# Patient Record
Sex: FEMALE | Race: WHITE | NOT HISPANIC OR LATINO | Employment: UNEMPLOYED | URBAN - METROPOLITAN AREA
[De-identification: names, ages, dates, MRNs, and addresses within clinical notes are randomized per-mention and may not be internally consistent; named-entity substitution may affect disease eponyms.]

---

## 2017-12-28 ENCOUNTER — OFFICE VISIT (OUTPATIENT)
Dept: OPHTHALMOLOGY | Facility: CLINIC | Age: 62
End: 2017-12-28
Payer: COMMERCIAL

## 2017-12-28 DIAGNOSIS — H52.4 MYOPIA WITH ASTIGMATISM AND PRESBYOPIA, BILATERAL: ICD-10-CM

## 2017-12-28 DIAGNOSIS — H52.203 MYOPIA WITH ASTIGMATISM AND PRESBYOPIA, BILATERAL: ICD-10-CM

## 2017-12-28 DIAGNOSIS — H52.13 MYOPIA WITH ASTIGMATISM AND PRESBYOPIA, BILATERAL: ICD-10-CM

## 2017-12-28 DIAGNOSIS — H25.13 NUCLEAR SCLEROSIS, BILATERAL: Primary | ICD-10-CM

## 2017-12-28 DIAGNOSIS — H25.013 CORTICAL AGE-RELATED CATARACT OF BOTH EYES: ICD-10-CM

## 2017-12-28 DIAGNOSIS — H18.529 CORNEAL EPITHELIAL AND BASEMENT MEMBRANE DYSTROPHY: ICD-10-CM

## 2017-12-28 PROCEDURE — 92004 COMPRE OPH EXAM NEW PT 1/>: CPT | Mod: S$GLB,,, | Performed by: OPTOMETRIST

## 2017-12-28 PROCEDURE — 99999 PR PBB SHADOW E&M-NEW PATIENT-LVL I: CPT | Mod: PBBFAC,,, | Performed by: OPTOMETRIST

## 2017-12-28 PROCEDURE — 92015 DETERMINE REFRACTIVE STATE: CPT | Mod: S$GLB,,, | Performed by: OPTOMETRIST

## 2017-12-28 NOTE — PROGRESS NOTES
HPI     Pts last exam was 12/3/14 with DNL. PT c/o overall blurred va and wears   gls for driving at night and otc readers prn. Did not bring gls today.   HPI    Any vision changes since last exam: no  Eye pain: no  Other ocular symptoms: dryness OS, no gtts    Do you wear currently wear glasses or contacts? gls and otc readers    Interested in contacts today? no    Do you plan on getting new glasses today? If needed      Last edited by Natalia Casey MA on 12/28/2017  3:44 PM. (History)            Assessment /Plan     For exam results, see Encounter Report.    Nuclear sclerosis, bilateral  Cortical age-related cataract of both eyes  Surgery is not indicated at this time. Monitor 12 months.    Corneal epithelial and basement membrane dystrophy  Minimally symptomatic   Recommended daily lubrication with AT bid OU    Myopia with astigmatism and presbyopia, bilateral  Eyeglass Final Rx     Eyeglass Final Rx       Sphere Cylinder Axis Dist VA Add    Right -0.75 +1.00 163 20/20 +2.50    Left -0.25 +0.50 005 20/20 +2.50    Expiration Date:  12/29/2018                RTC 1 yr for dilated eye exam or PRN if any problems.   Discussed above and answered questions.

## 2018-06-25 ENCOUNTER — OFFICE VISIT (OUTPATIENT)
Dept: OPHTHALMOLOGY | Facility: CLINIC | Age: 63
End: 2018-06-25
Payer: COMMERCIAL

## 2018-06-25 DIAGNOSIS — H20.9 TRAUMATIC IRITIS: Primary | ICD-10-CM

## 2018-06-25 PROCEDURE — 99999 PR PBB SHADOW E&M-EST. PATIENT-LVL I: CPT | Mod: PBBFAC,,, | Performed by: OPTOMETRIST

## 2018-06-25 PROCEDURE — 92014 COMPRE OPH EXAM EST PT 1/>: CPT | Mod: S$GLB,,, | Performed by: OPTOMETRIST

## 2018-06-25 RX ORDER — ROSUVASTATIN CALCIUM 5 MG/1
5 TABLET, COATED ORAL
COMMUNITY
Start: 2018-03-19 | End: 2019-03-19

## 2018-06-25 RX ORDER — PREDNISOLONE ACETATE 10 MG/ML
1 SUSPENSION/ DROPS OPHTHALMIC 4 TIMES DAILY
Qty: 5 ML | Refills: 0 | Status: SHIPPED | OUTPATIENT
Start: 2018-06-25 | End: 2018-07-02
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
## 2021-07-08 ENCOUNTER — OFFICE VISIT (OUTPATIENT)
Dept: OPHTHALMOLOGY | Facility: CLINIC | Age: 66
End: 2021-07-08
Payer: COMMERCIAL

## 2021-07-08 DIAGNOSIS — H25.13 NUCLEAR SCLEROSIS, BILATERAL: Primary | ICD-10-CM

## 2021-07-08 DIAGNOSIS — H25.013 CORTICAL AGE-RELATED CATARACT OF BOTH EYES: ICD-10-CM

## 2021-07-08 DIAGNOSIS — H52.4 PRESBYOPIA: ICD-10-CM

## 2021-07-08 DIAGNOSIS — H52.222 REGULAR ASTIGMATISM OF LEFT EYE: ICD-10-CM

## 2021-07-08 PROCEDURE — 92015 PR REFRACTION: ICD-10-PCS | Mod: S$GLB,,, | Performed by: OPTOMETRIST

## 2021-07-08 PROCEDURE — 99999 PR PBB SHADOW E&M-NEW PATIENT-LVL II: ICD-10-PCS | Mod: PBBFAC,,, | Performed by: OPTOMETRIST

## 2021-07-08 PROCEDURE — 92004 PR EYE EXAM, NEW PATIENT,COMPREHESV: ICD-10-PCS | Mod: S$GLB,,, | Performed by: OPTOMETRIST

## 2021-07-08 PROCEDURE — 92015 DETERMINE REFRACTIVE STATE: CPT | Mod: S$GLB,,, | Performed by: OPTOMETRIST

## 2021-07-08 PROCEDURE — 92004 COMPRE OPH EXAM NEW PT 1/>: CPT | Mod: S$GLB,,, | Performed by: OPTOMETRIST

## 2021-07-08 PROCEDURE — 99999 PR PBB SHADOW E&M-NEW PATIENT-LVL II: CPT | Mod: PBBFAC,,, | Performed by: OPTOMETRIST

## 2023-05-03 ENCOUNTER — PATIENT MESSAGE (OUTPATIENT)
Dept: RESEARCH | Facility: HOSPITAL | Age: 68
End: 2023-05-03
Payer: COMMERCIAL

## 2023-11-09 ENCOUNTER — OFFICE VISIT (OUTPATIENT)
Dept: OPHTHALMOLOGY | Facility: CLINIC | Age: 68
End: 2023-11-09
Payer: MEDICARE

## 2023-11-09 DIAGNOSIS — H52.4 ASTIGMATISM WITH PRESBYOPIA, BILATERAL: ICD-10-CM

## 2023-11-09 DIAGNOSIS — H25.13 NUCLEAR SCLEROSIS, BILATERAL: Primary | ICD-10-CM

## 2023-11-09 DIAGNOSIS — H52.203 ASTIGMATISM WITH PRESBYOPIA, BILATERAL: ICD-10-CM

## 2023-11-09 DIAGNOSIS — H25.013 CORTICAL AGE-RELATED CATARACT OF BOTH EYES: ICD-10-CM

## 2023-11-09 PROCEDURE — 1159F PR MEDICATION LIST DOCUMENTED IN MEDICAL RECORD: ICD-10-PCS | Mod: CPTII,S$GLB,, | Performed by: OPTOMETRIST

## 2023-11-09 PROCEDURE — 92015 DETERMINE REFRACTIVE STATE: CPT | Mod: S$GLB,,, | Performed by: OPTOMETRIST

## 2023-11-09 PROCEDURE — 92015 PR REFRACTION: ICD-10-PCS | Mod: S$GLB,,, | Performed by: OPTOMETRIST

## 2023-11-09 PROCEDURE — 1159F MED LIST DOCD IN RCRD: CPT | Mod: CPTII,S$GLB,, | Performed by: OPTOMETRIST

## 2023-11-09 PROCEDURE — 1160F RVW MEDS BY RX/DR IN RCRD: CPT | Mod: CPTII,S$GLB,, | Performed by: OPTOMETRIST

## 2023-11-09 PROCEDURE — 99999 PR PBB SHADOW E&M-EST. PATIENT-LVL II: CPT | Mod: PBBFAC,,, | Performed by: OPTOMETRIST

## 2023-11-09 PROCEDURE — 1160F PR REVIEW ALL MEDS BY PRESCRIBER/CLIN PHARMACIST DOCUMENTED: ICD-10-PCS | Mod: CPTII,S$GLB,, | Performed by: OPTOMETRIST

## 2023-11-09 PROCEDURE — 99999 PR PBB SHADOW E&M-EST. PATIENT-LVL II: ICD-10-PCS | Mod: PBBFAC,,, | Performed by: OPTOMETRIST

## 2023-11-09 PROCEDURE — 92014 COMPRE OPH EXAM EST PT 1/>: CPT | Mod: S$GLB,,, | Performed by: OPTOMETRIST

## 2023-11-09 PROCEDURE — 92014 PR EYE EXAM, EST PATIENT,COMPREHESV: ICD-10-PCS | Mod: S$GLB,,, | Performed by: OPTOMETRIST

## 2023-11-09 NOTE — PROGRESS NOTES
HPI     Annual Exam            Comments: Pt here for annual eye exam. Pt states he vision is getting   worse. Pt states things are blurry at near wears OTC readers+1.75. Pt   states headlights at night are very bright. Pt denies pain and discomfort.            Last edited by Mona Villela on 11/9/2023  2:23 PM.            Assessment /Plan     For exam results, see Encounter Report.    Nuclear sclerosis, bilateral  Cortical age-related cataract of both eyes  Cataract accounts for vision change. New Rx for glasses/contacts will not improve vision.   Refer to ophthalmologist for cataract evaluation.     Astigmatism with presbyopia, bilateral  Hold spec Rx      RTC next available with [facility] for cataract evaluation or PRN if any problems.   Discussed above and answered questions.

## 2024-01-11 ENCOUNTER — OFFICE VISIT (OUTPATIENT)
Dept: OPHTHALMOLOGY | Facility: CLINIC | Age: 69
End: 2024-01-11
Payer: MEDICARE

## 2024-01-11 DIAGNOSIS — H25.12 NUCLEAR SCLEROSIS OF LEFT EYE: ICD-10-CM

## 2024-01-11 DIAGNOSIS — H43.812 PVD (POSTERIOR VITREOUS DETACHMENT), LEFT EYE: ICD-10-CM

## 2024-01-11 DIAGNOSIS — H25.11 NUCLEAR SCLEROSIS OF RIGHT EYE: Primary | ICD-10-CM

## 2024-01-11 PROBLEM — E78.5 HYPERLIPIDEMIA: Status: ACTIVE | Noted: 2017-06-19

## 2024-01-11 PROBLEM — R73.02 IGT (IMPAIRED GLUCOSE TOLERANCE): Status: ACTIVE | Noted: 2017-12-20

## 2024-01-11 PROBLEM — M85.80 OSTEOPENIA: Status: ACTIVE | Noted: 2017-06-19

## 2024-01-11 PROCEDURE — 99999 PR PBB SHADOW E&M-EST. PATIENT-LVL III: CPT | Mod: PBBFAC,HCNC,, | Performed by: STUDENT IN AN ORGANIZED HEALTH CARE EDUCATION/TRAINING PROGRAM

## 2024-01-11 PROCEDURE — 1159F MED LIST DOCD IN RCRD: CPT | Mod: HCNC,CPTII,S$GLB, | Performed by: STUDENT IN AN ORGANIZED HEALTH CARE EDUCATION/TRAINING PROGRAM

## 2024-01-11 PROCEDURE — 1160F RVW MEDS BY RX/DR IN RCRD: CPT | Mod: HCNC,CPTII,S$GLB, | Performed by: STUDENT IN AN ORGANIZED HEALTH CARE EDUCATION/TRAINING PROGRAM

## 2024-01-11 PROCEDURE — 99204 OFFICE O/P NEW MOD 45 MIN: CPT | Mod: HCNC,S$GLB,, | Performed by: STUDENT IN AN ORGANIZED HEALTH CARE EDUCATION/TRAINING PROGRAM

## 2024-01-11 RX ORDER — PREDNISOLONE ACETATE 10 MG/ML
1 SUSPENSION/ DROPS OPHTHALMIC 4 TIMES DAILY
Qty: 5 ML | Refills: 1 | Status: CANCELLED | OUTPATIENT
Start: 2024-01-11

## 2024-01-11 NOTE — PROGRESS NOTES
HPI     Cataract     Additional comments: Pt co distortian, glare, at night ou  Pt co floaters on and off for years now   No pain  Pt co fol OD three weeks ago           Last edited by Roland Flynn on 1/11/2024  1:53 PM.            Assessment /Plan     For exam results, see Encounter Report.    Nuclear sclerosis of right eye- Visually Significant Cataract OU  Patient has a visually significant cataract(s) OU. Recommend cataract surgery. Patient defers treatment at this time  Monitor       *Patient wishes to wait on surgery and will call us back when she is ready to schedule     Nuclear sclerosis of left eye- Follow    PVD (posterior vitreous detachment), left eye- No tears or breaks were seen after careful retinal evaluation. Present >3 months per patient report.   RT/RD precautions    Discussed retinal detachment signs and symptoms including flashes of lights, floaters, perceived curtains or veils. Advised to patient to monitor visual status including increase in flashes and floaters, or the development of visual field changes including curtain and /or veils. Advised patient to RTC urgently if these symptoms occur. Explained the need for follow up exams to the patient even if there are no changes in the symptoms.

## 2024-03-01 ENCOUNTER — DOCUMENTATION ONLY (OUTPATIENT)
Dept: OPHTHALMOLOGY | Facility: CLINIC | Age: 69
End: 2024-03-01

## 2024-03-01 ENCOUNTER — OFFICE VISIT (OUTPATIENT)
Dept: OPHTHALMOLOGY | Facility: CLINIC | Age: 69
End: 2024-03-01
Payer: MEDICARE

## 2024-03-01 DIAGNOSIS — H25.11 NUCLEAR SCLEROSIS OF RIGHT EYE: Primary | ICD-10-CM

## 2024-03-01 DIAGNOSIS — H25.12 NUCLEAR SCLEROSIS OF LEFT EYE: ICD-10-CM

## 2024-03-01 PROCEDURE — 1160F RVW MEDS BY RX/DR IN RCRD: CPT | Mod: HCNC,CPTII,S$GLB, | Performed by: STUDENT IN AN ORGANIZED HEALTH CARE EDUCATION/TRAINING PROGRAM

## 2024-03-01 PROCEDURE — 1159F MED LIST DOCD IN RCRD: CPT | Mod: HCNC,CPTII,S$GLB, | Performed by: STUDENT IN AN ORGANIZED HEALTH CARE EDUCATION/TRAINING PROGRAM

## 2024-03-01 PROCEDURE — 99214 OFFICE O/P EST MOD 30 MIN: CPT | Mod: HCNC,S$GLB,, | Performed by: STUDENT IN AN ORGANIZED HEALTH CARE EDUCATION/TRAINING PROGRAM

## 2024-03-01 PROCEDURE — 99999 PR PBB SHADOW E&M-EST. PATIENT-LVL II: CPT | Mod: PBBFAC,HCNC,, | Performed by: STUDENT IN AN ORGANIZED HEALTH CARE EDUCATION/TRAINING PROGRAM

## 2024-03-01 PROCEDURE — 92136 OPHTHALMIC BIOMETRY: CPT | Mod: HCNC,LT,S$GLB, | Performed by: STUDENT IN AN ORGANIZED HEALTH CARE EDUCATION/TRAINING PROGRAM

## 2024-03-01 RX ORDER — PREDNISOLONE ACETATE 10 MG/ML
1 SUSPENSION/ DROPS OPHTHALMIC EVERY 4 HOURS
Qty: 5 ML | Refills: 1 | Status: SHIPPED | OUTPATIENT
Start: 2024-03-01 | End: 2025-03-01

## 2024-03-01 NOTE — PROGRESS NOTES
HPI    Pt in today for a cataract eval.   C/o blurred vision , glare, trouble driving at night , difficulty reading,   and seeing the television over the past several months    Which eye is most affected? OU    Do you have difficulty, even with glasses, with the following activities?    Reading small print such as labels on medicine bottles, a telephone book,   or food labels.   Yes  Reading a newspaper or book?  Yes  Seeing steps, stairs, or curbs  No  Reading traffic signs, street signs, or store signs  Yes  Doing fine handwork like sewing, knitting, crocheting or carpentry?  Yes  Writing checks or filling out forms  No  Playing games such as bingo, dominos, card games or mahjong?  No  Watching television?  Yes  Driving at night?  Yes    Do you have any upcoming procedures or surgeries?  No    Have you had any eye surgeries including LASIK or PRK?  No (if so, what kind)    Do you have VA insurance?   No (if so, notify MD for potential toric lens)     Last edited by Alisa ePdraza on 3/1/2024  2:24 PM.            Assessment /Plan     For exam results, see Encounter Report.    Nuclear sclerosis of right eye- Follow    Nuclear sclerosis of left eye  -  Visually Significant Cataract OS  Patient reports decreased vision consistent with the clinical amount of lenticular opacity, which reaches the level of visual significance and affects activities of daily living including reading and glare. Risks, benefits, and alternatives to cataract surgery were discussed.  Discussion of risks included possibility of infection as well as permanent vision loss.The pt expressed a desire to proceed with surgery with the potential for some reasonable degree of visual improvement. Recommended regular use of artificial tears and good lid hygiene to optimize surgical outcome.     Discussed IOL options and refractive outcomes for this patient.    Phaco left eye,   Topical  Will aim for Monofocal - Distance IOL    Intraop Kenalog 40:  No    Post op gtts:   PF      Discussed that vision may be limited by:  Astigmatism >1D    Dilation: good  Alpha Blockers: none    SS record completed, IOL master reviewed, external referral completed.   Referral to [facility] for Ophthalmic surgery  Prescriptions sent for preoperative medications  Explained that patient may need glasses after surgery.    RTC for POD#1

## 2024-03-01 NOTE — PROGRESS NOTES
Short Stay Record    Diagnosis: Nuclear Sclerotic Cataract left    CC: Blurry Vision     HPI:  Zulema Rees is a 68 y.o. female who presents for evaluation prior to ophthalmic surgery. No current complaints.     Past Medical History:   Diagnosis Date    Osteopenia      Past Surgical History:   Procedure Laterality Date    [facility] BSO  2000    DUB     Social History     Tobacco Use    Smoking status: Never    Smokeless tobacco: Never   Substance Use Topics    Alcohol use: Not on file     Family History   Problem Relation Age of Onset    Cataracts Mother     Glaucoma Paternal Aunt      Review of patient's allergies indicates:  No Known Allergies      Current Outpatient Medications:     calcium carbonate (CALCIUM 300 ORAL), , Disp: , Rfl:     estradioL (ESTRACE) 0.5 MG tablet, Take 1 tablet (0.5 mg total) by mouth once daily., Disp: 90 tablet, Rfl: 3    prednisoLONE acetate (PRED FORTE) 1 % DrpS, Place 1 drop into the left eye every 4 (four) hours., Disp: 5 mL, Rfl: 1    rosuvastatin (CRESTOR) 5 MG tablet, Take 5 mg by mouth., Disp: , Rfl:     Review of Systems:  10 Pt ROS negative except as stated in HPI    Physical Exam:  General Appearance:    A&Ox3, no distress, appears stated age   Head:    Normocephalic, without obvious abnormality, atraumatic   Eyes:    PERRL, EOM's intact   Back:     Symmetric, no curvature   Lungs:     respirations unlabored   Chest Wall:    No tenderness or deformity    Heart:  Abdomen:  Extremities:  Skin:    S1 and S2 present    Soft, non-tender    Extremities normal, atraumatic    Skin color, texture, turgor normal     Patient is stable for ophthalmic surgery under local and MAC.       _

## 2024-03-20 ENCOUNTER — OUTSIDE PLACE OF SERVICE (OUTPATIENT)
Dept: OPHTHALMOLOGY | Facility: CLINIC | Age: 69
End: 2024-03-20
Payer: MEDICARE

## 2024-03-20 PROCEDURE — 66984 XCAPSL CTRC RMVL W/O ECP: CPT | Mod: LT,,, | Performed by: STUDENT IN AN ORGANIZED HEALTH CARE EDUCATION/TRAINING PROGRAM

## 2024-03-21 ENCOUNTER — OFFICE VISIT (OUTPATIENT)
Dept: OPHTHALMOLOGY | Facility: CLINIC | Age: 69
End: 2024-03-21
Payer: MEDICARE

## 2024-03-21 DIAGNOSIS — Z98.890 POST-OPERATIVE STATE: ICD-10-CM

## 2024-03-21 DIAGNOSIS — Z98.42 CATARACT EXTRACTION STATUS OF EYE, LEFT: Primary | ICD-10-CM

## 2024-03-21 PROCEDURE — 1160F RVW MEDS BY RX/DR IN RCRD: CPT | Mod: HCNC,CPTII,S$GLB, | Performed by: STUDENT IN AN ORGANIZED HEALTH CARE EDUCATION/TRAINING PROGRAM

## 2024-03-21 PROCEDURE — 99999 PR PBB SHADOW E&M-EST. PATIENT-LVL II: CPT | Mod: PBBFAC,HCNC,, | Performed by: STUDENT IN AN ORGANIZED HEALTH CARE EDUCATION/TRAINING PROGRAM

## 2024-03-21 PROCEDURE — 99024 POSTOP FOLLOW-UP VISIT: CPT | Mod: HCNC,S$GLB,, | Performed by: STUDENT IN AN ORGANIZED HEALTH CARE EDUCATION/TRAINING PROGRAM

## 2024-03-21 PROCEDURE — 1159F MED LIST DOCD IN RCRD: CPT | Mod: HCNC,CPTII,S$GLB, | Performed by: STUDENT IN AN ORGANIZED HEALTH CARE EDUCATION/TRAINING PROGRAM

## 2024-03-21 NOTE — PROGRESS NOTES
HPI     Post-op Evaluation     Additional comments: Pt reports for 1 day PCIOL OS. Denies any pain or   irritation. Va stable. 100% compliant with gtts.            Comments    1. NS OD  PCIOL OS 3/20/24  2. PVD OS    Pred OS QID             Last edited by Yoni Diamond on 3/21/2024  2:39 PM.            Assessment /Plan     For exam results, see Encounter Report.    Cataract extraction status of eye, left  Post-operative state  POD#1 S/P CEIOL OS Doing well. Mild edema    Continue gtts to operative eye:  PF QID      Reinstructed in importance of absolute compliance with Post-OP instructions including medications, shield at bedtime, protective glasses during the day, and limitation of activities. Follow up appointments in approximately one and six weeks or call immediately for increased pain, redness or vision loss.     RTC 1 week. MOCT if PH worse than 20/25

## 2024-03-27 ENCOUNTER — DOCUMENTATION ONLY (OUTPATIENT)
Dept: OPHTHALMOLOGY | Facility: CLINIC | Age: 69
End: 2024-03-27

## 2024-03-27 ENCOUNTER — OFFICE VISIT (OUTPATIENT)
Dept: OPHTHALMOLOGY | Facility: CLINIC | Age: 69
End: 2024-03-27
Payer: MEDICARE

## 2024-03-27 DIAGNOSIS — Z98.890 POST-OPERATIVE STATE: ICD-10-CM

## 2024-03-27 DIAGNOSIS — H25.11 NUCLEAR SCLEROSIS OF RIGHT EYE: Primary | ICD-10-CM

## 2024-03-27 DIAGNOSIS — Z98.42 CATARACT EXTRACTION STATUS OF EYE, LEFT: ICD-10-CM

## 2024-03-27 PROCEDURE — 1160F RVW MEDS BY RX/DR IN RCRD: CPT | Mod: HCNC,CPTII,S$GLB, | Performed by: STUDENT IN AN ORGANIZED HEALTH CARE EDUCATION/TRAINING PROGRAM

## 2024-03-27 PROCEDURE — 1159F MED LIST DOCD IN RCRD: CPT | Mod: HCNC,CPTII,S$GLB, | Performed by: STUDENT IN AN ORGANIZED HEALTH CARE EDUCATION/TRAINING PROGRAM

## 2024-03-27 PROCEDURE — 92136 OPHTHALMIC BIOMETRY: CPT | Mod: 26,HCNC,RT,S$GLB | Performed by: STUDENT IN AN ORGANIZED HEALTH CARE EDUCATION/TRAINING PROGRAM

## 2024-03-27 PROCEDURE — 99999 PR PBB SHADOW E&M-EST. PATIENT-LVL II: CPT | Mod: PBBFAC,HCNC,, | Performed by: STUDENT IN AN ORGANIZED HEALTH CARE EDUCATION/TRAINING PROGRAM

## 2024-03-27 PROCEDURE — 99024 POSTOP FOLLOW-UP VISIT: CPT | Mod: HCNC,S$GLB,, | Performed by: STUDENT IN AN ORGANIZED HEALTH CARE EDUCATION/TRAINING PROGRAM

## 2024-03-27 RX ORDER — PREDNISOLONE ACETATE 10 MG/ML
1 SUSPENSION/ DROPS OPHTHALMIC 4 TIMES DAILY
Qty: 5 ML | Refills: 1 | Status: SHIPPED | OUTPATIENT
Start: 2024-03-27 | End: 2024-05-22 | Stop reason: SDUPTHER

## 2024-03-27 NOTE — PROGRESS NOTES
HPI     Cataract     Additional comments: Pt states she's ready to have the cataract removed   from her right eye. Pt states her vision is still blurry.            Comments    Patient here today for POW#1 visit s/p CEIOL of the left eye. Patient   states vision is doing well and is using drops. Denies any pain or   discomfort.  No other ocular complaints     1. NS OD  PCIOL OS 3/20/24  2. PVD OS    Pred OS QID             Last edited by Alisa Pedraza on 3/27/2024  9:28 AM.            Assessment /Plan     For exam results, see Encounter Report.    Cataract extraction status of eye, left  Post-operative state  Impression/Plan  POW#1 S/P CEIOL OS : Doing well with no evidence of infection.     Trace Cell. PF Taper 4-3-2-1 then stop    Pt given and instructed in one week postop instructions. Can resume normal activitites and d/c eye shield. OTC reading glasses can be used until evaluated for final      Nuclear sclerosis of right eye  Patient reports decreased vision in the fellow eye consistent with the clinical amount of lenticular opacity, which reaches the level of visual significance and affects activities of daily living including reading and glare. Risks, benefits, and alternatives to cataract surgery were discussed and pt desired to schedule cataract surgery. Pt was consented and the biometry and lens options were reviewed.     Phaco right eye,   Topical  Will aim for Monofocal - Distance IOL    Intraop Kenalog 40: No    Post op gtts:   PF      Discussed that vision may be limited by:  none      SS record completed, IOL master reviewed, external referral completed.   Referral to [facility] for Ophthalmic surgery  Prescriptions sent for preoperative medications  Explained that patient may need glasses after surgery.    RTC for POD#1

## 2024-03-27 NOTE — PROGRESS NOTES
Short Stay Record    Diagnosis: Nuclear Sclerotic Cataract right    CC: Blurry Vision     HPI:  Zulema Rees is a 68 y.o. female who presents for evaluation prior to ophthalmic surgery. No current complaints.     Past Medical History:   Diagnosis Date    Cataract     Osteopenia      Past Surgical History:   Procedure Laterality Date    CATARACT EXTRACTION Left 03/20/2024    [facility] BSO  2000    DUB     Social History     Tobacco Use    Smoking status: Never    Smokeless tobacco: Never   Substance Use Topics    Alcohol use: Not on file     Family History   Problem Relation Age of Onset    Cataracts Mother     Glaucoma Paternal Aunt      Review of patient's allergies indicates:  No Known Allergies      Current Outpatient Medications:     calcium carbonate (CALCIUM 300 ORAL), , Disp: , Rfl:     estradioL (ESTRACE) 0.5 MG tablet, Take 1 tablet (0.5 mg total) by mouth once daily., Disp: 90 tablet, Rfl: 3    prednisoLONE acetate (PRED FORTE) 1 % DrpS, Place 1 drop into the left eye every 4 (four) hours., Disp: 5 mL, Rfl: 1    prednisoLONE acetate (PRED FORTE) 1 % DrpS, Place 1 drop into the right eye 4 (four) times daily., Disp: 5 mL, Rfl: 1    rosuvastatin (CRESTOR) 5 MG tablet, Take 5 mg by mouth., Disp: , Rfl:     Review of Systems:  10 Pt ROS negative except as stated in HPI    Physical Exam:  General Appearance:    A&Ox3, no distress, appears stated age   Head:    Normocephalic, without obvious abnormality, atraumatic   Eyes:    PERRL, EOM's intact   Back:     Symmetric, no curvature   Lungs:     respirations unlabored   Chest Wall:    No tenderness or deformity    Heart:  Abdomen:  Extremities:  Skin:    S1 and S2 present    Soft, non-tender    Extremities normal, atraumatic    Skin color, texture, turgor normal     Patient is stable for ophthalmic surgery under local and MAC.       _

## 2024-04-03 ENCOUNTER — OUTSIDE PLACE OF SERVICE (OUTPATIENT)
Dept: OPHTHALMOLOGY | Facility: CLINIC | Age: 69
End: 2024-04-03
Payer: MEDICARE

## 2024-04-03 PROCEDURE — 66984 XCAPSL CTRC RMVL W/O ECP: CPT | Mod: 79,RT,, | Performed by: STUDENT IN AN ORGANIZED HEALTH CARE EDUCATION/TRAINING PROGRAM

## 2024-04-04 ENCOUNTER — OFFICE VISIT (OUTPATIENT)
Dept: OPHTHALMOLOGY | Facility: CLINIC | Age: 69
End: 2024-04-04
Payer: MEDICARE

## 2024-04-04 DIAGNOSIS — Z98.41 CATARACT EXTRACTION STATUS OF EYE, RIGHT: Primary | ICD-10-CM

## 2024-04-04 DIAGNOSIS — Z98.890 POST-OPERATIVE STATE: ICD-10-CM

## 2024-04-04 PROCEDURE — 99999 PR PBB SHADOW E&M-EST. PATIENT-LVL II: CPT | Mod: PBBFAC,HCNC,, | Performed by: STUDENT IN AN ORGANIZED HEALTH CARE EDUCATION/TRAINING PROGRAM

## 2024-04-04 PROCEDURE — 99024 POSTOP FOLLOW-UP VISIT: CPT | Mod: HCNC,S$GLB,, | Performed by: STUDENT IN AN ORGANIZED HEALTH CARE EDUCATION/TRAINING PROGRAM

## 2024-04-04 PROCEDURE — 1159F MED LIST DOCD IN RCRD: CPT | Mod: HCNC,CPTII,S$GLB, | Performed by: STUDENT IN AN ORGANIZED HEALTH CARE EDUCATION/TRAINING PROGRAM

## 2024-04-04 PROCEDURE — 1160F RVW MEDS BY RX/DR IN RCRD: CPT | Mod: HCNC,CPTII,S$GLB, | Performed by: STUDENT IN AN ORGANIZED HEALTH CARE EDUCATION/TRAINING PROGRAM

## 2024-04-04 NOTE — PROGRESS NOTES
HPI     Post-op Evaluation     Additional comments: POD#1 s/p CEIOL OD. Has received appropriate post-op   drops. Denies any discomfort. No new ocular complaints.             Comments    1. PCIOL OS 3/20/24  PCIOL OD 04/03/24  2. PVD OS    Pred QID OD             Last edited by Alisa Pedraza on 4/4/2024  3:17 PM.            Assessment /Plan     For exam results, see Encounter Report.    Cataract extraction status of eye, right    Post-operative state    POD#1 S/P CEIOL OD Doing well. Mild edema    Continue gtts to operative eye:  PF QID      Reinstructed in importance of absolute compliance with Post-OP instructions including medications, shield at bedtime, protective glasses during the day, and limitation of activities. Follow up appointments in approximately one and six weeks or call immediately for increased pain, redness or vision loss.     RTC 1 week. MOCT if PH worse than 20/25

## 2024-04-11 ENCOUNTER — OFFICE VISIT (OUTPATIENT)
Dept: OPHTHALMOLOGY | Facility: CLINIC | Age: 69
End: 2024-04-11
Payer: MEDICARE

## 2024-04-11 DIAGNOSIS — Z98.890 POST-OPERATIVE STATE: ICD-10-CM

## 2024-04-11 DIAGNOSIS — Z98.41 CATARACT EXTRACTION STATUS OF EYE, RIGHT: Primary | ICD-10-CM

## 2024-04-11 PROCEDURE — 99999 PR PBB SHADOW E&M-EST. PATIENT-LVL II: CPT | Mod: PBBFAC,HCNC,, | Performed by: STUDENT IN AN ORGANIZED HEALTH CARE EDUCATION/TRAINING PROGRAM

## 2024-04-11 PROCEDURE — 99024 POSTOP FOLLOW-UP VISIT: CPT | Mod: HCNC,S$GLB,, | Performed by: STUDENT IN AN ORGANIZED HEALTH CARE EDUCATION/TRAINING PROGRAM

## 2024-04-11 PROCEDURE — 1159F MED LIST DOCD IN RCRD: CPT | Mod: HCNC,CPTII,S$GLB, | Performed by: STUDENT IN AN ORGANIZED HEALTH CARE EDUCATION/TRAINING PROGRAM

## 2024-04-11 PROCEDURE — 1160F RVW MEDS BY RX/DR IN RCRD: CPT | Mod: HCNC,CPTII,S$GLB, | Performed by: STUDENT IN AN ORGANIZED HEALTH CARE EDUCATION/TRAINING PROGRAM

## 2024-04-11 NOTE — PROGRESS NOTES
HPI     Post-op Evaluation     Additional comments: Patient here today for POW#1 visit s/p CEIOL of the   right eye. Patient states vision is doing well and is using drops. Denies   any pain or discomfort.  No other ocular complaints           Last edited by Chantell Hughes on 4/11/2024 10:25 AM.            Assessment /Plan     For exam results, see Encounter Report.    Cataract extraction status of eye, right    Post-operative state    Impression/Plan  POW#1 S/P CEIOL OD : Doing well with no evidence of infection.     Continue gtts to operative eye:   Trace Cell. PF Taper 4-3-2-1 then stop    Pt given and instructed in one week postop instructions. Can resume normal activitites and d/c eye shield. OTC reading glasses can be used until evaluated for final MR. PENA 3 Mo w/ DNL

## 2024-05-22 ENCOUNTER — OFFICE VISIT (OUTPATIENT)
Dept: OPHTHALMOLOGY | Facility: CLINIC | Age: 69
End: 2024-05-22
Payer: MEDICARE

## 2024-05-22 DIAGNOSIS — Z98.890 POST-OPERATIVE STATE: Primary | ICD-10-CM

## 2024-05-22 DIAGNOSIS — H25.12 NUCLEAR SCLEROSIS OF LEFT EYE: ICD-10-CM

## 2024-05-22 DIAGNOSIS — H20.00 ACUTE IRITIS OF RIGHT EYE: ICD-10-CM

## 2024-05-22 PROCEDURE — 99999 PR PBB SHADOW E&M-EST. PATIENT-LVL I: CPT | Mod: PBBFAC,HCNC,, | Performed by: OPTOMETRIST

## 2024-05-22 PROCEDURE — 1159F MED LIST DOCD IN RCRD: CPT | Mod: HCNC,CPTII,S$GLB, | Performed by: OPTOMETRIST

## 2024-05-22 PROCEDURE — 1160F RVW MEDS BY RX/DR IN RCRD: CPT | Mod: HCNC,CPTII,S$GLB, | Performed by: OPTOMETRIST

## 2024-05-22 PROCEDURE — 99024 POSTOP FOLLOW-UP VISIT: CPT | Mod: HCNC,S$GLB,, | Performed by: OPTOMETRIST

## 2024-05-22 RX ORDER — PREDNISOLONE ACETATE 10 MG/ML
SUSPENSION/ DROPS OPHTHALMIC
Qty: 5 ML | Refills: 1 | Status: SHIPPED | OUTPATIENT
Start: 2024-05-22 | End: 2024-06-05

## 2024-05-22 NOTE — PROGRESS NOTES
HPI     Eye Pain            Comments: Pt states she had CAT SX in early April on the OD and now it   throbs and is very light sensitive.   Pain Scale:  4  Onset:   over 1 month  OD, OS, OU:   OD  Discharge:   no  A.M. Matting:  no  Itch:   no  Redness:   no  Photophobia:   yes  Foreign body sensation:   yes, scratchy  Deep pain:   no  Previous occurrence:   no  Drops:   bausch and lomb for dry eyes           Last edited by Tanya Bravo on 5/22/2024 10:51 AM.            Assessment /Plan     For exam results, see Encounter Report.    Post-operative state    Acute iritis of right eye  -     prednisoLONE acetate (PRED FORTE) 1 % DrpS; Place 1 drop into the right eye 4 (four) times daily for 7 days, THEN 1 drop 3 (three) times daily for 7 days.  Dispense: 5 mL; Refill: 1    Nuclear sclerosis of left eye      Mild rebound iritis, ++symptomatic  Resume Pred qid OD for 7 days and then tid OD until next visit     RTC 2 week for iritis follow up, PRN sooner with any worsening or if not significantly improved x 2 days.   Discussed above and answered questions.

## 2024-06-03 ENCOUNTER — OFFICE VISIT (OUTPATIENT)
Dept: OPHTHALMOLOGY | Facility: CLINIC | Age: 69
End: 2024-06-03
Payer: MEDICARE

## 2024-06-03 DIAGNOSIS — H20.00 ACUTE IRITIS OF RIGHT EYE: Primary | ICD-10-CM

## 2024-06-03 PROCEDURE — 1160F RVW MEDS BY RX/DR IN RCRD: CPT | Mod: HCNC,CPTII,S$GLB, | Performed by: OPTOMETRIST

## 2024-06-03 PROCEDURE — 99999 PR PBB SHADOW E&M-EST. PATIENT-LVL II: CPT | Mod: PBBFAC,HCNC,, | Performed by: OPTOMETRIST

## 2024-06-03 PROCEDURE — 99024 POSTOP FOLLOW-UP VISIT: CPT | Mod: HCNC,S$GLB,, | Performed by: OPTOMETRIST

## 2024-06-03 PROCEDURE — 1159F MED LIST DOCD IN RCRD: CPT | Mod: HCNC,CPTII,S$GLB, | Performed by: OPTOMETRIST

## 2024-06-03 NOTE — PROGRESS NOTES
HPI     Eye Problem            Comments: The patient states her right eye is much improved, she states   it is not 100% resolved but it is better. The patient is using gtts as   prescribed TID OD.          Comments    1. PCIOL OS 3/20/24  PCIOL OD 04/03/24  2. PVD OS  3. Acute Iritis OD    Pred TID OD             Last edited by Ashely Hanna on 6/3/2024 10:14 AM.            Assessment /Plan     For exam results, see Encounter Report.    Acute iritis of right eye    Resolved nicely  No pain  IOP stable   Continue Pred on taper schedule of bid OD for 14 days then qd OD for 7 days     RTC as scheduled for dilated eye exam or PRN if any problems.   Discussed above and answered questions.

## 2024-07-08 ENCOUNTER — PATIENT MESSAGE (OUTPATIENT)
Dept: OPHTHALMOLOGY | Facility: CLINIC | Age: 69
End: 2024-07-08

## 2024-07-08 ENCOUNTER — OFFICE VISIT (OUTPATIENT)
Dept: OPHTHALMOLOGY | Facility: CLINIC | Age: 69
End: 2024-07-08
Payer: MEDICARE

## 2024-07-08 DIAGNOSIS — H52.4 MYOPIA WITH ASTIGMATISM AND PRESBYOPIA, BILATERAL: ICD-10-CM

## 2024-07-08 DIAGNOSIS — H52.203 MYOPIA WITH ASTIGMATISM AND PRESBYOPIA, BILATERAL: ICD-10-CM

## 2024-07-08 DIAGNOSIS — H52.13 MYOPIA WITH ASTIGMATISM AND PRESBYOPIA, BILATERAL: ICD-10-CM

## 2024-07-08 DIAGNOSIS — Z96.1 PSEUDOPHAKIA OF BOTH EYES: Primary | ICD-10-CM

## 2024-07-08 DIAGNOSIS — H26.491 PCO (POSTERIOR CAPSULAR OPACIFICATION), RIGHT: ICD-10-CM

## 2024-07-08 PROCEDURE — 1159F MED LIST DOCD IN RCRD: CPT | Mod: HCNC,CPTII,S$GLB, | Performed by: OPTOMETRIST

## 2024-07-08 PROCEDURE — 1160F RVW MEDS BY RX/DR IN RCRD: CPT | Mod: HCNC,CPTII,S$GLB, | Performed by: OPTOMETRIST

## 2024-07-08 PROCEDURE — 92014 COMPRE OPH EXAM EST PT 1/>: CPT | Mod: HCNC,S$GLB,, | Performed by: OPTOMETRIST

## 2024-07-08 PROCEDURE — 99999 PR PBB SHADOW E&M-EST. PATIENT-LVL II: CPT | Mod: PBBFAC,HCNC,, | Performed by: OPTOMETRIST

## 2024-07-08 NOTE — PROGRESS NOTES
HPI     Annual Exam            Comments: Vision changes since last eye exam?: pt co decrease reading va     Any eye pain today: no     Other ocular symptoms: floaters on and off for years    Interested in contact lens fitting today? no                      Comments    1. PCIOL OS 3/20/24  PCIOL OD 04/03/24  2. PVD OS  3. Acute Iritis OD                Last edited by Roland Flynn on 7/8/2024  1:27 PM.            Assessment /Plan     For exam results, see Encounter Report.    Pseudophakia of both eyes  PCO (posterior capsular opacification), right  Stable OU, doing well  No iritis  Mild PCO OD, YAG not yet indicated  Monitor 12 months    Myopia with astigmatism and presbyopia, bilateral  Spec Rx is optional, okay to continue with OTC Readers     RTC 1 yr for dilated eye exam or PRN if any problems.   Discussed above and answered questions.

## 2025-03-13 ENCOUNTER — PATIENT MESSAGE (OUTPATIENT)
Dept: ADMINISTRATIVE | Facility: OTHER | Age: 70
End: 2025-03-13
Payer: MEDICARE